# Patient Record
Sex: FEMALE | Race: WHITE | NOT HISPANIC OR LATINO | Employment: FULL TIME | URBAN - METROPOLITAN AREA
[De-identification: names, ages, dates, MRNs, and addresses within clinical notes are randomized per-mention and may not be internally consistent; named-entity substitution may affect disease eponyms.]

---

## 2025-04-30 ENCOUNTER — OFFICE VISIT (OUTPATIENT)
Dept: URGENT CARE | Facility: CLINIC | Age: 35
End: 2025-04-30
Payer: COMMERCIAL

## 2025-04-30 VITALS
RESPIRATION RATE: 16 BRPM | HEART RATE: 103 BPM | TEMPERATURE: 98.3 F | OXYGEN SATURATION: 97 % | SYSTOLIC BLOOD PRESSURE: 110 MMHG | DIASTOLIC BLOOD PRESSURE: 76 MMHG

## 2025-04-30 DIAGNOSIS — T78.3XXA ANGIOEDEMA WITH URTICARIA DUE TO DRUG: Primary | ICD-10-CM

## 2025-04-30 DIAGNOSIS — T50.905A ANGIOEDEMA WITH URTICARIA DUE TO DRUG: Primary | ICD-10-CM

## 2025-04-30 PROCEDURE — 96372 THER/PROPH/DIAG INJ SC/IM: CPT | Performed by: PHYSICIAN ASSISTANT

## 2025-04-30 PROCEDURE — 2500000004 HC RX 250 GENERAL PHARMACY W/ HCPCS (ALT 636 FOR OP/ED): Mod: JZ | Performed by: PHYSICIAN ASSISTANT

## 2025-04-30 PROCEDURE — 99212 OFFICE O/P EST SF 10 MIN: CPT | Performed by: PHYSICIAN ASSISTANT

## 2025-04-30 RX ORDER — ESCITALOPRAM OXALATE 20 MG/1
20 TABLET ORAL
COMMUNITY
Start: 2025-01-23

## 2025-04-30 RX ORDER — METHYLPREDNISOLONE SODIUM SUCCINATE 125 MG/2ML
62.5 INJECTION INTRAMUSCULAR; INTRAVENOUS ONCE
Status: COMPLETED | OUTPATIENT
Start: 2025-04-30 | End: 2025-04-30

## 2025-04-30 RX ORDER — PREDNISONE 10 MG/1
30 TABLET ORAL DAILY
Qty: 21 TABLET | Refills: 0 | Status: SHIPPED | OUTPATIENT
Start: 2025-04-30 | End: 2025-05-07

## 2025-04-30 RX ORDER — METHYLPREDNISOLONE ACETATE 80 MG/ML
40 INJECTION, SUSPENSION INTRA-ARTICULAR; INTRALESIONAL; INTRAMUSCULAR; SOFT TISSUE ONCE
Status: COMPLETED | OUTPATIENT
Start: 2025-04-30 | End: 2025-04-30

## 2025-04-30 RX ADMIN — METHYLPREDNISOLONE ACETATE 40 MG: 80 INJECTION, SUSPENSION INTRA-ARTICULAR; INTRALESIONAL; INTRAMUSCULAR; SOFT TISSUE at 13:04

## 2025-04-30 RX ADMIN — METHYLPREDNISOLONE SODIUM SUCCINATE 62.5 MG: 125 INJECTION, POWDER, FOR SOLUTION INTRAMUSCULAR; INTRAVENOUS at 13:05

## 2025-04-30 NOTE — PROGRESS NOTES
[facility] URGENT CARE SHAYNA NOTE:      Name: Debbi Flores, 34 y.o.    CSN:[id number]   MRN:[id number]    PCP: Nicolasa Chandra, APRN-CNP    ALL:  Allergies[1]    History:    Chief Complaint: Hives and swelling in lips and hands (X yesterday after taking Bactrim)    Encounter Date: 4/30/2025      HPI: The history was obtained from the patient. Debbi is a 34 y.o. female, who presents with a chief complaint of Hives and swelling in lips and hands (X yesterday after taking Bactrim)     Debbi was diagnosed with a UTI in the ED on 4/21. She was prescribed bactrim, which she has taken since 4/21. She stopped bactrim on 4/28 due to nausea, she even had an ER visit for abdominal discomfort with nausea.     Yesterday, she began noticing hives & this morning she has had swelling of her lips, eyes, and hands. She has taken benadryl last night and this morning without improvement.  She denies fever, swelling of the tongue, or difficulty breathing.     The patient has had allergic reactions to antibiotics in the past, specifically penicillin, amoxicillin, sulfa drugs, and duricef. Her current symptoms are similar to those she has experienced in previous allergic reactions.    Her UTI has resolved. She says she has a 5 mm kidney stone on her left side, which she has a scheduled appointment with urology this Friday to address.       PMHx:    Medical History[2]         Current Medications[3]      PMSx:  Surgical History[4]    Fam Hx: Family History[5]    SOC. Hx:     Social History     Socioeconomic History    Marital status: Single     Spouse name: Not on file    Number of children: Not on file    Years of education: Not on file    Highest education level: Not on file   Occupational History    Not on file   Tobacco Use    Smoking status: Not on file    Smokeless tobacco: Not on file   Substance and Sexual Activity    Alcohol use: Not on file    Drug use: Not on file    Sexual activity: Not on  file   Other Topics Concern    Not on file   Social History Narrative    Not on file     Social Drivers of Health     Financial Resource Strain: Not on file   Food Insecurity: Not on file   Transportation Needs: Not on file   Physical Activity: Not on file   Stress: Not on file   Social Connections: Not on file   Intimate Partner Violence: Not on file   Housing Stability: Not on file         Vitals:    04/30/25 1244   BP: 110/76   Pulse: 103   Resp: 16   Temp: 36.8 °C (98.3 °F)   SpO2: 97%                Physical Exam  Vitals reviewed.   Constitutional:       Appearance: Normal appearance. She is obese.   HENT:      Head: Normocephalic and atraumatic.      Mouth/Throat:      Comments: Airway non-obstructed  Eyes:      Extraocular Movements: Extraocular movements intact.      Pupils: Pupils are equal, round, and reactive to light.   Cardiovascular:      Rate and Rhythm: Normal rate and regular rhythm.      Heart sounds: Normal heart sounds.   Pulmonary:      Effort: Pulmonary effort is normal.      Breath sounds: Normal breath sounds.   Musculoskeletal:      Comments: Edema appreciated in hands and fingers bilaterally.    Skin:     Comments: Hives present   Neurological:      Mental Status: She is alert.         ____________________________________________________________________    I did personally review Debbi's past medical history, surgical history, social history, as well as family history (when relevant).  In this case, I also oversaw the her drug management by reviewing her medication list, allergy list, as well as the medications that I prescribed during the UC course and/or recommended as an out-patient (including possible OTC medications such as acetaminophen, NSAIDs , etc).    After reviewing the items above, I did look at previous medical documentation, such as recent hospitalizations, office visits, and/or recent consultations with PCP/specialist.                          SDOH:   Another factor that I  considered in Debbi's care was her Social Determinants of Health (SDOH). During this UC encounter, she did not have social determinants of health. Those SDOH influencing Debbi's care are: none       COURSE/MEDICAL DECISION MAKING:    Debbi is a 34 y.o., who presents with a working diagnosis of   1. Angioedema with urticaria due to drug     with a differential to include:   Allergic contact dermatitis, drug eruption, erythema multiform.     Plan:   Solumedrol 62.5 mg. Depo medrol 40 mg injection IM x1  Prednisone 30 mg IM x1    I, [employer] student, helped prepare the medical record for my supervising clinician, Sujit Wakefield PA-C.   SAHRA Ty, [facility]        Supervised by  Sujit Wakefield PA-C   Advanced Practice Provider  [facility] URGENT CARE    I was present with the PA student who participated in the documentation of this note. I have personally seen and re-examined the patient and performed the medical decision-making components (assessment and plan of care). I have reviewed the PA student documentation and verified the findings in the note as written with additions or exceptions as stated in the body of this note.    Sujit Wakefield PA-C              [1]   Allergies  Allergen Reactions    Amoxicillin-Pot Clavulanate Hives    Cefadroxil Hives    Sulfa (Sulfonamide Antibiotics) Hives   [2] No past medical history on file.  [3]   Current Outpatient Medications   Medication Sig Dispense Refill    escitalopram (Lexapro) 20 mg tablet Take 1 tablet (20 mg) by mouth once daily.      predniSONE (Deltasone) 10 mg tablet Take 3 tablets (30 mg) by mouth once daily for 7 days. 21 tablet 0     No current facility-administered medications for this visit.   [4] No past surgical history on file.  [5] No family history on file.

## 2025-04-30 NOTE — PATIENT INSTRUCTIONS
STOP THE BACTRIM DS IF NOT ALREADY DONE. THERE ARE DRUG TAKE BACK DAYS SCHEDULED THROUGH [facility] IF YOU WANT TO DESTROY THE MEDICATION PROPERLY.

## 2025-05-02 ENCOUNTER — APPOINTMENT (OUTPATIENT)
Dept: UROLOGY | Facility: CLINIC | Age: 35
End: 2025-05-02

## 2025-05-02 VITALS
SYSTOLIC BLOOD PRESSURE: 138 MMHG | HEIGHT: 64 IN | WEIGHT: 249 LBS | HEART RATE: 92 BPM | DIASTOLIC BLOOD PRESSURE: 72 MMHG | BODY MASS INDEX: 42.51 KG/M2

## 2025-05-02 DIAGNOSIS — N20.0 RENAL STONES: Primary | ICD-10-CM

## 2025-05-02 PROCEDURE — 99204 OFFICE O/P NEW MOD 45 MIN: CPT | Performed by: UROLOGY

## 2025-05-02 PROCEDURE — 3008F BODY MASS INDEX DOCD: CPT | Performed by: UROLOGY

## 2025-05-02 ASSESSMENT — PATIENT HEALTH QUESTIONNAIRE - PHQ9
SUM OF ALL RESPONSES TO PHQ9 QUESTIONS 1 AND 2: 0
2. FEELING DOWN, DEPRESSED OR HOPELESS: NOT AT ALL
1. LITTLE INTEREST OR PLEASURE IN DOING THINGS: NOT AT ALL

## 2025-05-05 NOTE — PROGRESS NOTES
CHIEF COMPLAINT:  Patient presents to the office today to discuss:  1. Kidney stone    HPI TODAY: 05/02/2025:  Hx of kidney stones since age 15. Around Easter, severe pain, went to walk-in where CT scan showed 4-5mm stone. Reports nausea, vomiting. Recent allergic reaction to Bactrim with hives, swelling, stopped 3 days ago. CT from 04/22/2025 shows 3mm stone in left kidney. 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 Lives in [place].    PMH, PSH, MEDS, ALLERGIES, SH, and FH:  - Information reviewed with patient and in EMR, with changes made where appropriate.  - PSH: Hx of 4-5 kidney stone procedures since age 15, primarily lithotripsy. Last procedure ~2 years ago. Reports stent placement when stone fragment became stuck.  - Meds: Lexapro  - Allergies: Bactrim (hives, swelling)  - SH: Occasional vaping with alcohol. Drinks ~once monthly.    PHYSICAL EXAMINATION:  Constitutional: Alert, no acute distress  Musculoskeletal: No costovertebral angle tenderness    ASSESSMENT AND PLAN:  Patient with 3mm left kidney stone causing symptoms including nausea and vomiting.    1. Left renal calculus (N20.0)  - Assessment: 3mm stone in left kidney per CT scan 04/22/2025.  - Plan: Discussed management options. Will follow up in 6 months with kidney ultrasound to minimize radiation exposure.  - Counseling: Explained 3mm stones typically don't require intervention as procedures can cause pain/trauma. Stone may remain stable, grow, or pass spontaneously.    ORDERS:  Renal ultrasound in 6 months    FOLLOW UP:  Follow up in 6 months with renal ultrasound.    **SHORT SUMMARY:  Patient with 3mm left kidney stone experiencing symptoms; plan for conservative management with follow-up ultrasound in 6 months.**

## 2025-11-07 ENCOUNTER — APPOINTMENT (OUTPATIENT)
Dept: UROLOGY | Facility: CLINIC | Age: 35
End: 2025-11-07
Payer: COMMERCIAL